# Patient Record
Sex: MALE | Race: WHITE | NOT HISPANIC OR LATINO | Employment: FULL TIME | URBAN - METROPOLITAN AREA
[De-identification: names, ages, dates, MRNs, and addresses within clinical notes are randomized per-mention and may not be internally consistent; named-entity substitution may affect disease eponyms.]

---

## 2017-11-16 ENCOUNTER — APPOINTMENT (OUTPATIENT)
Dept: RADIOLOGY | Facility: CLINIC | Age: 32
End: 2017-11-16
Payer: OTHER MISCELLANEOUS

## 2017-11-16 ENCOUNTER — TRANSCRIBE ORDERS (OUTPATIENT)
Dept: URGENT CARE | Facility: CLINIC | Age: 32
End: 2017-11-16

## 2017-11-16 ENCOUNTER — GENERIC CONVERSION - ENCOUNTER (OUTPATIENT)
Dept: OTHER | Facility: OTHER | Age: 32
End: 2017-11-16

## 2017-11-16 ENCOUNTER — OFFICE VISIT (OUTPATIENT)
Dept: URGENT CARE | Facility: CLINIC | Age: 32
End: 2017-11-16
Payer: OTHER MISCELLANEOUS

## 2017-11-16 DIAGNOSIS — M54.50 LOWER BACK PAIN: ICD-10-CM

## 2017-11-16 PROCEDURE — 99283 EMERGENCY DEPT VISIT LOW MDM: CPT

## 2017-11-16 PROCEDURE — G0382 LEV 3 HOSP TYPE B ED VISIT: HCPCS

## 2017-11-16 PROCEDURE — 72100 X-RAY EXAM L-S SPINE 2/3 VWS: CPT

## 2017-11-30 ENCOUNTER — OFFICE VISIT (OUTPATIENT)
Dept: URGENT CARE | Facility: CLINIC | Age: 32
End: 2017-11-30
Payer: OTHER MISCELLANEOUS

## 2017-11-30 PROCEDURE — 99213 OFFICE O/P EST LOW 20 MIN: CPT

## 2017-12-01 ENCOUNTER — TRANSCRIBE ORDERS (OUTPATIENT)
Dept: ADMINISTRATIVE | Facility: HOSPITAL | Age: 32
End: 2017-12-01

## 2017-12-01 DIAGNOSIS — M54.5 LOW BACK PAIN, UNSPECIFIED BACK PAIN LATERALITY, UNSPECIFIED CHRONICITY, WITH SCIATICA PRESENCE UNSPECIFIED: Primary | ICD-10-CM

## 2017-12-11 ENCOUNTER — HOSPITAL ENCOUNTER (OUTPATIENT)
Dept: MRI IMAGING | Facility: HOSPITAL | Age: 32
Discharge: HOME | End: 2017-12-11
Payer: OTHER MISCELLANEOUS

## 2017-12-11 ENCOUNTER — GENERIC CONVERSION - ENCOUNTER (OUTPATIENT)
Dept: URGENT CARE | Facility: CLINIC | Age: 32
End: 2017-12-11

## 2017-12-11 DIAGNOSIS — M54.5 LOW BACK PAIN, UNSPECIFIED BACK PAIN LATERALITY, UNSPECIFIED CHRONICITY, WITH SCIATICA PRESENCE UNSPECIFIED: ICD-10-CM

## 2017-12-11 PROCEDURE — 72148 MRI LUMBAR SPINE W/O DYE: CPT

## 2017-12-14 ENCOUNTER — OFFICE VISIT (OUTPATIENT)
Dept: URGENT CARE | Facility: CLINIC | Age: 32
End: 2017-12-14
Payer: OTHER MISCELLANEOUS

## 2017-12-14 PROCEDURE — 99213 OFFICE O/P EST LOW 20 MIN: CPT

## 2017-12-15 ENCOUNTER — GENERIC CONVERSION - ENCOUNTER (OUTPATIENT)
Dept: OTHER | Facility: OTHER | Age: 32
End: 2017-12-15

## 2018-01-10 ENCOUNTER — ALLSCRIPTS OFFICE VISIT (OUTPATIENT)
Dept: OTHER | Facility: OTHER | Age: 33
End: 2018-01-10

## 2018-01-10 DIAGNOSIS — M54.16 RADICULOPATHY OF LUMBAR REGION: ICD-10-CM

## 2018-01-11 NOTE — RESULT NOTES
Verified Results  * XR SPINE LUMBAR 2 OR 3 VIEWS INJURY [id number] 11:33AM Jacob Gustafson     Test Name Result Flag Reference   XR SPINE LUMBAR 2 OR 3 VIEWS (Report)     LUMBAR SPINE     INDICATION: Lifting injury 4 days ago  Low back pain radiating into left buttock and left leg  Burning sensation right foot  COMPARISON: None     VIEWS: AP, lateral and coned-down projections     IMAGES: 3     FINDINGS:     Straightening of the lumbar lordotic curvature  Grade 1 retrolisthesis L5 on S1  Mild disc space narrowing with degenerative endplate changes throughout the lumbar spine, most pronounced L5-S1  Facet hypertrophic changes L3-L4, L4-L5 and L5-S1  There is no radiographic evidence of acute fracture or destructive osseous lesion  Visualized soft tissues appear unremarkable  IMPRESSION:   Degenerative changes lumbar spine, most pronounced L5-S1         Workstation performed: [id number]     Signed by:   Justine Samayoa DO   11/16/17

## 2018-01-12 NOTE — PROGRESS NOTES
Assessment   1  Lumbar stenosis (724 02) (M48 061)   2  Left lumbar radiculopathy (724 4) (M54 16)    Plan   Left lumbar radiculopathy    · Diclofenac Potassium 50 MG Oral Tablet; TAKE 1 TABLET TWICE DAILY   · *1 - [facility]  * for lumbar radiculopathy  Status: Active     Requested for: [id number]  Care Summary provided  : Yes   · *1 - [facility]  * referral for epidural corticosteroid     injection  Status: Active  Requested for: [id number]  Care Summary provided  : Yes   · Follow-up visit in 6 weeks Evaluation and Treatment  Follow-up  Status: Complete  Done:    [id number]    Discussion/Summary      27-year-old male with lumbar spinal stenosis at L5-S1 with left lower extremity radiculopathy  Discussed with the patient at this point time we recommend that he initiate physical therapy as well as anti-inflammatory medications  We will arrange for a follow-up of spine and pain noted to range up to a steroid injection  Plan is as follows:   as tolerated bilateral lower extremity therapy for lumbar radiculopathy 50 mg twice a day p r n  pain referral for consideration of epidural steroid injection in 6 weeks to re-evaluate symptoms  Chief Complaint   1  Back Pain    History of Present Illness   HPI: 27-year-old male with a 3 month history of left lower extremity shooting pain and occasional numbness  This was due to work-related injury when he was lifting a heavy object and felt immediate pinching of his lower back and left lower extremity symptoms  Is tried oral medication including muscle relaxant  He has not had any spinal injections  No recent fevers or chills      Review of Systems        Constitutional: No fever or chills, feels well, no tiredness, no recent weight loss or weight gain  Eyes: No complaints of red eyes, no eyesight problems  ENT: no complaints of loss of hearing, no nosebleeds, no sore throat        Cardiovascular: No complaints of chest pain, no palpitations, no leg claudication or lower extremity edema  Respiratory: No complaints of shortness of breath, no wheezing, no cough  Gastrointestinal: No complaints of abdominal pain, no constipation, no nausea or vomiting, no diarrhea or bloody stools  Genitourinary: No complaints of dysuria or incontinence, no hesitancy, no nocturia  Musculoskeletal: as noted in HPI  Integumentary: as noted in HPI  Neurological: as noted in HPI  Psychiatric: No suicidal thoughts, no anxiety, no depression  Endocrine: No muscle weakness, no frequent urination, no excessive thirst, no feelings of weakness  ROS reviewed  Past Medical History      The active problems and past medical history were reviewed and updated today  Surgical History      The surgical history was reviewed and updated today  Family History   Family History    · No pertinent family history     The family history was reviewed and updated today  Social History    · Alcohol use (V49 89) (Z78 9)   · Caffeine use (V49 89) (F15 90)   · Coffee  The social history was reviewed and updated today  Current Meds    1  Naproxen 250 MG Oral Tablet Recorded     The medication list was reviewed and updated today  Allergies   1  No Known Drug Allergies    Vitals   Signs   Heart Rate: 71  Systolic: 079  Diastolic: 87  Height: 5 ft 10 5 in  Weight: 220 lb   BMI Calculated: 31 12  BSA Calculated: 2 19    Physical Exam     Lumbar spine:  Skin is intact there is no erythema  There is no tenderness to palpation over the spinous processes or paraspinal musculature  Diminished sensation in the left L5 and S1 distributions  Motor strength is intact bilaterally to hip flexion, knee extension and flexion, plantar flexion dorsiflexion of the ankle, and EHL  Toes are warm and well perfused  Dorsalis pedis pulses present  L4 and S1 reflexes are present   Positive straight leg raise on the left lower extremity  Negative straight leg raise in right lower extremity  Constitutional - General appearance: Normal       Musculoskeletal - Muscle strength/tone: Normal -- Upper extremity compartments: Normal -- Lower extremity compartments: Abnormal       Cardiovascular - Pulses: Normal -- Examination of extremities for edema and/or varicosities: Normal -- Heart - RRR, no murmurs, no rubs, no gallops  Lymphatic - Palpation of lymph nodes in other areas: Normal       Skin - Skin and subcutaneous tissue: Normal       Neurologic - Sensation: Normal -- Upper extremity peripheral neuro exam: Normal -- Lower extremity peripheral neuro exam: Abnormal       Psychiatric - Orientation to person, place, and time: Normal -- Mood and affect: Normal       Eyes      Conjunctiva and lids: Normal        Pupils and irises: Normal        Results/Data   I personally reviewed the films/images/results in the office today  My interpretation follows  X-ray Review Disc space narrowing noted at L5-S1  MRI Review MRI lumbar spine: Left paracentral disc protrusion at L5-S1  Attending Note   Attending Note [facility]: Level of Participation: I was present in clinic and examined the patient  Patient's History: Patient presents for evaluation of a work related injury with resultant left-sided lower back pain that radiates into his foot  He sustained his injury in November of 2017 while lifting heavy objects  He reports pain that radiates into his left lower extremity was socially numbness and tingling  Symptoms are worse with activity  Symptoms wax and wane in nature  He does report subjective weakness  Does not report incontinence of bowel or bladder  Key Parts of the Exam: Awake alert and oriented x3  Affect is appropriate  Patient does ambulate independently  He has difficulty with toe walking and heel walking on the left  In the seated position he has a positive straight leg raise left lower extremity   Negative contralateral straight leg raise  Negative Salty's test bilaterally  He does have good strength in the L2-S1 distributions bilaterally  spine MRI demonstrates large left-sided disc herniation at L5-S1 causing significant lateral recess stenosis  Diagnosis and Plan: Lumbar DDD and herniation L5-S1  Severe spinal stenosis left L5-S1  Resultant radiculopathy  A referral will be placed Pain management for epidural steroid injections at L5-S1 on the left as well as physical therapy for range of motion and strengthening  Follow-up in 4-6 weeks for re-evaluation        Signatures    Electronically signed by : Marcos Pickard MD; Emiliano 10 2018  4:11PM EST                       (Author)     Electronically signed by : OSCAR Zepeda ; Emiliano 10 2018  4:20PM EST                       (Author)

## 2018-01-18 ENCOUNTER — EVALUATION (OUTPATIENT)
Dept: PHYSICAL THERAPY | Facility: CLINIC | Age: 33
End: 2018-01-18
Payer: OTHER MISCELLANEOUS

## 2018-01-18 PROCEDURE — 97535 SELF CARE MNGMENT TRAINING: CPT

## 2018-01-18 PROCEDURE — G8991 OTHER PT/OT GOAL STATUS: HCPCS

## 2018-01-18 PROCEDURE — 97162 PT EVAL MOD COMPLEX 30 MIN: CPT

## 2018-01-18 PROCEDURE — G8990 OTHER PT/OT CURRENT STATUS: HCPCS

## 2018-01-23 VITALS
HEIGHT: 71 IN | HEART RATE: 71 BPM | WEIGHT: 220 LBS | BODY MASS INDEX: 30.8 KG/M2 | DIASTOLIC BLOOD PRESSURE: 87 MMHG | SYSTOLIC BLOOD PRESSURE: 125 MMHG

## 2018-01-23 NOTE — RESULT NOTES
Verified Results  * MRI LUMBAR SPINE WO CONTRAST [id number] 08:29AM Frantz Mendez     Test Name Result Flag Reference   MRI LUMBAR SPINE WO CONTRAST (Report)     MRI LUMBAR SPINE WITHOUT CONTRAST     INDICATION: M54 5: Low back pain  History taken directly from the electronic ordering system  lifting injury at work a few weeks ago  low back pain radiating down both legs-mostly left leg  bottom of right foot-becomes warm pt states numbness low back    and left leg when riding in car      COMPARISON: 11/16/2017     TECHNIQUE: Sagittal T1, sagittal T2, sagittal inversion recovery, axial T1 and axial T2, coronal T2       IMAGE QUALITY: Diagnostic     FINDINGS:     ALIGNMENT: Mild levoscoliosis upper lumbar spine  Normal lordosis  MARROW SIGNAL: Scattered degenerative endplate changes  No focally suspicious marrow lesions  No bone marrow edema or compression abnormality  DISTAL CORD AND CONUS: Normal size and signal within the distal cord and conus  The conus ends at the L1 level  PARASPINAL SOFT TISSUES: Paraspinal soft tissues are unremarkable  SACRUM: Normal signal within the sacrum  No evidence of insufficiency or stress fracture  LOWER THORACIC DISC SPACES: Normal disc height and signal  No disc herniation, canal stenosis or foraminal narrowing  LUMBAR DISC SPACES:        L1-L2: Normal      L2-L3: Normal      L3-L4: Normal      L4-L5: There is a diffuse disk bulge  No significant central canal or neural foraminal narrowing  L5-S1: Large left paracentral/neural foraminal disc protrusion  Severe central canal narrowing  Severe left neural foraminal narrowing  Mild right neural foraminal narrowing  IMPRESSION:     Left paracentral disc protrusion at L5-S1  Spine surgical evaluation suggested  Workstation performed: [id number]     Signed by:    Lizeth Rodriguez MD   12/12/17       Signatures   Electronically signed by : Damon Barnes NP; Dec 13 2017  2:57PM EST (Author)

## 2018-02-02 ENCOUNTER — TELEPHONE (OUTPATIENT)
Dept: PAIN MEDICINE | Facility: CLINIC | Age: 33
End: 2018-02-02

## 2018-02-12 ENCOUNTER — CONSULT (OUTPATIENT)
Dept: PAIN MEDICINE | Facility: CLINIC | Age: 33
End: 2018-02-12
Payer: OTHER MISCELLANEOUS

## 2018-02-12 VITALS
HEART RATE: 82 BPM | TEMPERATURE: 98.6 F | BODY MASS INDEX: 33.21 KG/M2 | SYSTOLIC BLOOD PRESSURE: 124 MMHG | DIASTOLIC BLOOD PRESSURE: 80 MMHG | WEIGHT: 232 LBS | HEIGHT: 70 IN

## 2018-02-12 DIAGNOSIS — M54.16 LUMBAR RADICULOPATHY: Primary | ICD-10-CM

## 2018-02-12 PROCEDURE — 99204 OFFICE O/P NEW MOD 45 MIN: CPT | Performed by: ANESTHESIOLOGY

## 2018-02-12 RX ORDER — IBUPROFEN 200 MG
TABLET ORAL EVERY 6 HOURS PRN
COMMUNITY

## 2018-02-12 RX ORDER — NAPROXEN 250 MG/1
TABLET ORAL
COMMUNITY

## 2018-02-12 RX ORDER — CYCLOBENZAPRINE HCL 5 MG
10 TABLET ORAL 3 TIMES DAILY PRN
COMMUNITY

## 2018-02-12 NOTE — PROGRESS NOTES
Assessment:  1  Lumbar radiculopathy - Bilateral         Plan: This is a 68-year-old male who presents today for initial consultation for management of low back pain and left leg pain following a work-related injury  Physical examination demonstrates positive straight leg raise on the left  The patient has completed a course of physical therapy and continues to undergo home exercises without significant relief of pain  The patient is on NSAIDs and muscle relaxant as well  MRI of the lumbosacral spine without contrast demonstrates a large paracentral disc protrusion causing severe central canal narrowing on the left  The patient's pain persists despite time, relative rest, activity modification and therapy  I recommend a [left L5, S1] transforaminal epidural steroid injection to diminish any inflammatory component of the pain  We will initially use a transforaminal approach to better concentrate the steroid along the affected nerve root  The injection may need to be repeated based on the degree of pain relief following the initial injection  In the office today, we reviewed the nature of the patient's pathology in depth using diagrams and models  We discussed the approach we would use for the epidural steroid injection and provided literature for home review  The patient understands the risks associated with the procedure including bleeding, infection, tissue injury, allergic reaction and paralysis and provided written and verbal consent in the office today  Patient is encouraged to continue with home exercises  To help with muscle spasms, Flexeril 5 mg p o  T i d  should be continued  NSAIDs  Will be continued to help with inflammation  My impressions and treatment recommendations were discussed in detail with the patient who verbalized understanding and had no further questions  Discharge instructions were provided   I personally saw and examined the patient and I agree with the above discussed plan of care  No orders of the defined types were placed in this encounter  New Medications Ordered This Visit   Medications    naproxen (NAPROSYN) 250 mg tablet     Sig: Take by mouth    cyclobenzaprine (FLEXERIL) 5 mg tablet     Sig: Take 10 mg by mouth 3 (three) times a day as needed for muscle spasms    ibuprofen (MOTRIN) 200 mg tablet     Sig: Take by mouth every 6 (six) hours as needed for mild pain       History of Present Illness:    Carrillo Orr is a 28 y o  male Who presents today for initial consultation for management of low back pain and left leg pain following a work-related injury  Today patient reports moderate to severe pain which he rates 8/10 on a pain scale  His pain is nearly constant, worse in the morning  Patient further describes pain as burning, shooting with numbness and pins and needles sensation down the left leg and occasional soreness on the right foot  Pain is aggravated with prolonged standing, bending, sitting  He denies loss of bladder or bowel  The patient recently saw Dr Mignon Pedraza for evaluation  MRI of the  Lumbosacral spine without contrast demonstrates severe left L5-S1 disc herniation  Patient is currently on Flexeril 5 mg p o  T i d , naproxen 250 mg twice a day and ibuprofen 200 mg every 6 hours p r n  Pain  Patient has completed a course of physical therapy and continues to perform home exercises without significant relief of pain  The patient is here today to consider epidural steroid injections  I have personally reviewed and/or updated the patient's past medical history, past surgical history, family history, social history, current medications, allergies, and vital signs today  Review of Systems:    Review of Systems   Constitutional: Negative for fever and unexpected weight change  HENT: Negative for trouble swallowing  Eyes: Negative for visual disturbance  Respiratory: Negative for shortness of breath and wheezing  Cardiovascular: Negative for chest pain and palpitations  Gastrointestinal: Negative for constipation, diarrhea, nausea and vomiting  Endocrine: Negative for cold intolerance, heat intolerance and polydipsia  Genitourinary: Negative for difficulty urinating and frequency  Musculoskeletal: Positive for back pain and gait problem  Negative for arthralgias, joint swelling and myalgias  Skin: Negative for rash  Neurological: Negative for dizziness, seizures, syncope, weakness and headaches  Hematological: Does not bruise/bleed easily  Psychiatric/Behavioral: Negative for dysphoric mood  All other systems reviewed and are negative  There is no problem list on file for this patient  History reviewed  No pertinent past medical history  History reviewed  No pertinent surgical history  History reviewed  No pertinent family history  Social History     Occupational History    Not on file  Social History Main Topics    Smoking status: Not on file    Smokeless tobacco: Not on file    Alcohol use Not on file    Drug use: Unknown    Sexual activity: Not on file       No current outpatient prescriptions on file prior to visit  No current facility-administered medications on file prior to visit  No Known Allergies    Physical Exam:    /80   Pulse 82   Temp 98 6 °F (37 °C)   Ht 5' 10" (1 778 m)   Wt 105 kg (232 lb)   BMI 33 29 kg/m²     Constitutional: normal, well developed, well nourished, alert, in no distress and non-toxic and no overt pain behavior    Eyes: anicteric  HEENT: grossly intact  Neck: supple, symmetric, trachea midline and no masses   Pulmonary:even and unlabored  Cardiovascular:No edema or pitting edema present  Skin:Normal without rashes or lesions and well hydrated  Psychiatric:Mood and affect appropriate  Neurologic:Cranial Nerves II-XII grossly intact  Musculoskeletal:normal    Lumbar Spine Exam    Appearance:  Normal lordosis  Palpation/Tenderness:  left lumbar paraspinal tenderness  Sensory:  no sensory deficits noted  diminished pin prick sensation, location: left leg and foot  dimished light touch sensation, location: left leg and foot  Range of Motion:  Full range of motion with no pain or limitations in flexion, extension, lateral flexion and rotation  Motor Strength:  Left foot dorsiflexion:  4/5  Left foot plantar flexion:  4/5  Reflexes:  Left Patellar:  2+   Special Tests:  Left Straight Leg Test:  positive    Imaging

## 2018-02-20 ENCOUNTER — HOSPITAL ENCOUNTER (OUTPATIENT)
Dept: RADIOLOGY | Facility: CLINIC | Age: 33
Discharge: HOME | End: 2018-02-20
Attending: ANESTHESIOLOGY
Payer: OTHER MISCELLANEOUS

## 2018-02-20 VITALS
RESPIRATION RATE: 18 BRPM | DIASTOLIC BLOOD PRESSURE: 89 MMHG | OXYGEN SATURATION: 97 % | SYSTOLIC BLOOD PRESSURE: 133 MMHG | WEIGHT: 230 LBS | BODY MASS INDEX: 33 KG/M2 | TEMPERATURE: 98.1 F | HEART RATE: 91 BPM

## 2018-02-20 DIAGNOSIS — M54.16 LUMBAR RADICULOPATHY: Primary | ICD-10-CM

## 2018-02-20 PROCEDURE — 64484 NJX AA&/STRD TFRM EPI L/S EA: CPT | Performed by: ANESTHESIOLOGY

## 2018-02-20 PROCEDURE — 64483 NJX AA&/STRD TFRM EPI L/S 1: CPT | Performed by: ANESTHESIOLOGY

## 2018-02-20 RX ORDER — LIDOCAINE HYDROCHLORIDE 10 MG/ML
5 INJECTION, SOLUTION EPIDURAL; INFILTRATION; INTRACAUDAL; PERINEURAL ONCE
Status: COMPLETED | OUTPATIENT
Start: 2018-02-20 | End: 2018-02-20

## 2018-02-20 RX ORDER — NAPROXEN 500 MG/1
500 TABLET ORAL 2 TIMES DAILY WITH MEALS
Qty: 60 TABLET | Refills: 2 | Status: SHIPPED | OUTPATIENT
Start: 2018-02-20 | End: 2018-03-22

## 2018-02-20 RX ORDER — PAPAVERINE HCL 150 MG
20 CAPSULE, EXTENDED RELEASE ORAL ONCE
Status: COMPLETED | OUTPATIENT
Start: 2018-02-20 | End: 2018-02-20

## 2018-02-20 RX ORDER — BUPIVACAINE HCL/PF 2.5 MG/ML
10 VIAL (ML) INJECTION ONCE
Status: COMPLETED | OUTPATIENT
Start: 2018-02-20 | End: 2018-02-20

## 2018-02-20 RX ADMIN — DEXAMETHASONE SODIUM PHOSPHATE 20 MG: 10 INJECTION, SOLUTION INTRAMUSCULAR; INTRAVENOUS at 08:45

## 2018-02-20 RX ADMIN — LIDOCAINE HYDROCHLORIDE 5 ML: 10 INJECTION, SOLUTION EPIDURAL; INFILTRATION; INTRACAUDAL; PERINEURAL at 08:39

## 2018-02-20 RX ADMIN — BUPIVACAINE HYDROCHLORIDE 10 ML: 2.5 INJECTION, SOLUTION EPIDURAL; INFILTRATION; INTRACAUDAL at 08:44

## 2018-02-20 RX ADMIN — IOHEXOL 2 ML: 300 INJECTION, SOLUTION INTRAVENOUS at 08:44

## 2018-02-20 NOTE — H&P
History of Present Illness: The patient is a 28 y o  male who presents with complaints of low back and left leg pain  Patient is here for a left L5, S1 TFESI  Patient Active Problem List   Diagnosis    Lumbar radiculopathy - Left       No past medical history on file  No past surgical history on file  Current Outpatient Prescriptions:     cyclobenzaprine (FLEXERIL) 5 mg tablet, Take 10 mg by mouth 3 (three) times a day as needed for muscle spasms, Disp: , Rfl:     ibuprofen (MOTRIN) 200 mg tablet, Take by mouth every 6 (six) hours as needed for mild pain, Disp: , Rfl:     naproxen (NAPROSYN) 250 mg tablet, Take by mouth, Disp: , Rfl:     Current Facility-Administered Medications:     bupivacaine (PF) (MARCAINE) 0 25 % injection 10 mL, 10 mL, Perineural, Once, Marty Stark MD    dexamethasone (PF) (DECADRON) injection 20 mg, 20 mg, Perineural, Once, Marty Stark MD    iohexol (OMNIPAQUE) 300 mg/mL injection 50 mL, 50 mL, Perineural, Once, Marty Stark MD    lidocaine (PF) (XYLOCAINE-MPF) 1 % injection 5 mL, 5 mL, Perineural, Once, Marty Stark MD    No Known Allergies    Physical Exam:   Vitals:    02/20/18 0814   BP: 129/85   Pulse: 91   Resp: 18   Temp: 98 1 °F (36 7 °C)   SpO2: 97%     General: Awake, Alert, Oriented x 3, Mood and affect appropriate  Respiratory: Respirations even and unlabored  Cardiovascular: Peripheral pulses intact; no edema  Musculoskeletal Exam: Positive SLR Left     ASA Score: 2    Assessment: Lumbar radiculitis       Patient is instructed to continue with light duty until he is seen back in the office for reassessment - 4 weeks post epidural      Plan: left L5, S1

## 2018-02-20 NOTE — H&P (VIEW-ONLY)
Assessment:  1  Lumbar radiculopathy - Bilateral         Plan: This is a 58-year-old male who presents today for initial consultation for management of low back pain and left leg pain following a work-related injury  Physical examination demonstrates positive straight leg raise on the left  The patient has completed a course of physical therapy and continues to undergo home exercises without significant relief of pain  The patient is on NSAIDs and muscle relaxant as well  MRI of the lumbosacral spine without contrast demonstrates a large paracentral disc protrusion causing severe central canal narrowing on the left  The patient's pain persists despite time, relative rest, activity modification and therapy  I recommend a [left L5, S1] transforaminal epidural steroid injection to diminish any inflammatory component of the pain  We will initially use a transforaminal approach to better concentrate the steroid along the affected nerve root  The injection may need to be repeated based on the degree of pain relief following the initial injection  In the office today, we reviewed the nature of the patient's pathology in depth using diagrams and models  We discussed the approach we would use for the epidural steroid injection and provided literature for home review  The patient understands the risks associated with the procedure including bleeding, infection, tissue injury, allergic reaction and paralysis and provided written and verbal consent in the office today  Patient is encouraged to continue with home exercises  To help with muscle spasms, Flexeril 5 mg p o  T i d  should be continued  NSAIDs  Will be continued to help with inflammation  My impressions and treatment recommendations were discussed in detail with the patient who verbalized understanding and had no further questions  Discharge instructions were provided   I personally saw and examined the patient and I agree with the above discussed plan of care  No orders of the defined types were placed in this encounter  New Medications Ordered This Visit   Medications    naproxen (NAPROSYN) 250 mg tablet     Sig: Take by mouth    cyclobenzaprine (FLEXERIL) 5 mg tablet     Sig: Take 10 mg by mouth 3 (three) times a day as needed for muscle spasms    ibuprofen (MOTRIN) 200 mg tablet     Sig: Take by mouth every 6 (six) hours as needed for mild pain       History of Present Illness:    Malik Beckford is a 28 y o  male Who presents today for initial consultation for management of low back pain and left leg pain following a work-related injury  Today patient reports moderate to severe pain which he rates 8/10 on a pain scale  His pain is nearly constant, worse in the morning  Patient further describes pain as burning, shooting with numbness and pins and needles sensation down the left leg and occasional soreness on the right foot  Pain is aggravated with prolonged standing, bending, sitting  He denies loss of bladder or bowel  The patient recently saw Dr Aubrie Richards for evaluation  MRI of the  Lumbosacral spine without contrast demonstrates severe left L5-S1 disc herniation  Patient is currently on Flexeril 5 mg p o  T i d , naproxen 250 mg twice a day and ibuprofen 200 mg every 6 hours p r n  Pain  Patient has completed a course of physical therapy and continues to perform home exercises without significant relief of pain  The patient is here today to consider epidural steroid injections  I have personally reviewed and/or updated the patient's past medical history, past surgical history, family history, social history, current medications, allergies, and vital signs today  Review of Systems:    Review of Systems   Constitutional: Negative for fever and unexpected weight change  HENT: Negative for trouble swallowing  Eyes: Negative for visual disturbance  Respiratory: Negative for shortness of breath and wheezing  Cardiovascular: Negative for chest pain and palpitations  Gastrointestinal: Negative for constipation, diarrhea, nausea and vomiting  Endocrine: Negative for cold intolerance, heat intolerance and polydipsia  Genitourinary: Negative for difficulty urinating and frequency  Musculoskeletal: Positive for back pain and gait problem  Negative for arthralgias, joint swelling and myalgias  Skin: Negative for rash  Neurological: Negative for dizziness, seizures, syncope, weakness and headaches  Hematological: Does not bruise/bleed easily  Psychiatric/Behavioral: Negative for dysphoric mood  All other systems reviewed and are negative  There is no problem list on file for this patient  History reviewed  No pertinent past medical history  History reviewed  No pertinent surgical history  History reviewed  No pertinent family history  Social History     Occupational History    Not on file  Social History Main Topics    Smoking status: Not on file    Smokeless tobacco: Not on file    Alcohol use Not on file    Drug use: Unknown    Sexual activity: Not on file       No current outpatient prescriptions on file prior to visit  No current facility-administered medications on file prior to visit  No Known Allergies    Physical Exam:    /80   Pulse 82   Temp 98 6 °F (37 °C)   Ht 5' 10" (1 778 m)   Wt 105 kg (232 lb)   BMI 33 29 kg/m²     Constitutional: normal, well developed, well nourished, alert, in no distress and non-toxic and no overt pain behavior    Eyes: anicteric  HEENT: grossly intact  Neck: supple, symmetric, trachea midline and no masses   Pulmonary:even and unlabored  Cardiovascular:No edema or pitting edema present  Skin:Normal without rashes or lesions and well hydrated  Psychiatric:Mood and affect appropriate  Neurologic:Cranial Nerves II-XII grossly intact  Musculoskeletal:normal    Lumbar Spine Exam    Appearance:  Normal lordosis  Palpation/Tenderness:  left lumbar paraspinal tenderness  Sensory:  no sensory deficits noted  diminished pin prick sensation, location: left leg and foot  dimished light touch sensation, location: left leg and foot  Range of Motion:  Full range of motion with no pain or limitations in flexion, extension, lateral flexion and rotation  Motor Strength:  Left foot dorsiflexion:  4/5  Left foot plantar flexion:  4/5  Reflexes:  Left Patellar:  2+   Special Tests:  Left Straight Leg Test:  positive    Imaging

## 2018-02-20 NOTE — DISCHARGE INSTRUCTIONS
Epidural Steroid Injection   WHAT YOU NEED TO KNOW:   An epidural steroid injection (MARLENE) is a procedure to inject steroid medicine into the epidural space  The epidural space is between your spinal cord and vertebrae  Steroids reduce inflammation and fluid buildup in your spine that may be causing pain  You may be given pain medicine along with the steroids  ACTIVITY  · Do not drive or operate machinery today  · No strenuous activity today - bending, lifting, etc   · You may resume normal activites starting tomorrow - start slowly and as tolerated  · You may shower today, but no tub baths or hot tubs  · You may have numbness for several hours from the local anesthetic  Please use caution and common sense, especially with weight-bearing activities  CARE OF THE INJECTION SITE  · If you have soreness or pain, apply ice to the area today (20 minutes on/20 minutes off)  · Starting tomorrow, you may use warm, moist heat or ice if needed  · You may have an increase or change in your discomfort for 36-48 hours after your treatment  · Apply ice and continue with any pain medication you have been prescribed  · Notify [facility] if you have any of the following: redness, drainage, swelling, headache, stiff neck or fever above 100°F     SPECIAL INSTRUCTIONS  · Our office will contact you in approximately 7 days for a progress report  MEDICATIONS  · Continue to take all routine medications  · Our office may have instructed you to hold some medications  If you have a problem specifically related to your procedure, please call our office at [phone]  Problems not related to your procedure should be directed to your primary care physician

## 2018-02-20 NOTE — INTERVAL H&P NOTE
Update: (This section must be completed if the H&P was completed greater than 24 hrs to procedure or admission)    H&P reviewed  After examining the patient, I find no changed to the H&P since it had been written  Patient re-evaluated   Accept as history and physical     Vijayaroger Navarro MD/February 20, 2018/8:29 AM

## 2018-02-28 ENCOUNTER — TELEPHONE (OUTPATIENT)
Dept: RADIOLOGY | Facility: CLINIC | Age: 33
End: 2018-02-28

## 2021-04-23 ENCOUNTER — APPOINTMENT (EMERGENCY)
Dept: CT IMAGING | Facility: HOSPITAL | Age: 36
End: 2021-04-23
Payer: COMMERCIAL

## 2021-04-23 ENCOUNTER — HOSPITAL ENCOUNTER (EMERGENCY)
Facility: HOSPITAL | Age: 36
Discharge: HOME | End: 2021-04-23
Attending: EMERGENCY MEDICINE
Payer: COMMERCIAL

## 2021-04-23 ENCOUNTER — APPOINTMENT (EMERGENCY)
Dept: RADIOLOGY | Facility: HOSPITAL | Age: 36
End: 2021-04-23
Payer: COMMERCIAL

## 2021-04-23 VITALS
WEIGHT: 244.71 LBS | HEART RATE: 79 BPM | RESPIRATION RATE: 16 BRPM | BODY MASS INDEX: 35.11 KG/M2 | SYSTOLIC BLOOD PRESSURE: 119 MMHG | OXYGEN SATURATION: 97 % | DIASTOLIC BLOOD PRESSURE: 73 MMHG | TEMPERATURE: 99 F

## 2021-04-23 DIAGNOSIS — J93.9 PNEUMOTHORAX ON RIGHT: Primary | ICD-10-CM

## 2021-04-23 DIAGNOSIS — R10.9 ABDOMINAL PAIN: ICD-10-CM

## 2021-04-23 LAB
ALBUMIN SERPL BCP-MCNC: 3.8 G/DL (ref 3.5–5)
ALP SERPL-CCNC: 42 U/L (ref 46–116)
ALT SERPL W P-5'-P-CCNC: 81 U/L (ref 12–78)
ANION GAP SERPL CALCULATED.3IONS-SCNC: 10 MMOL/L (ref 4–13)
AST SERPL W P-5'-P-CCNC: 24 U/L (ref 5–45)
BASOPHILS # BLD AUTO: 0.02 THOUSANDS/ΜL (ref 0–0.1)
BASOPHILS NFR BLD AUTO: 0 % (ref 0–1)
BILIRUB SERPL-MCNC: 0.56 MG/DL (ref 0.2–1)
BUN SERPL-MCNC: 11 MG/DL (ref 5–25)
CALCIUM SERPL-MCNC: 8.1 MG/DL (ref 8.3–10.1)
CHLORIDE SERPL-SCNC: 105 MMOL/L (ref 100–108)
CO2 SERPL-SCNC: 26 MMOL/L (ref 21–32)
CREAT SERPL-MCNC: 0.95 MG/DL (ref 0.6–1.3)
EOSINOPHIL # BLD AUTO: 0.02 THOUSAND/ΜL (ref 0–0.61)
EOSINOPHIL NFR BLD AUTO: 0 % (ref 0–6)
ERYTHROCYTE [DISTWIDTH] IN BLOOD BY AUTOMATED COUNT: 13.2 % (ref 11.6–15.1)
GFR SERPL CREATININE-BSD FRML MDRD: 103 ML/MIN/1.73SQ M
GLUCOSE SERPL-MCNC: 108 MG/DL (ref 65–140)
HCT VFR BLD AUTO: 45.7 % (ref 36.5–49.3)
HGB BLD-MCNC: 15.4 G/DL (ref 12–17)
IMM GRANULOCYTES # BLD AUTO: 0.05 THOUSAND/UL (ref 0–0.2)
IMM GRANULOCYTES NFR BLD AUTO: 0 % (ref 0–2)
LIPASE SERPL-CCNC: 65 U/L (ref 73–393)
LYMPHOCYTES # BLD AUTO: 0.38 THOUSANDS/ΜL (ref 0.6–4.47)
LYMPHOCYTES NFR BLD AUTO: 3 % (ref 14–44)
MCH RBC QN AUTO: 30.7 PG (ref 26.8–34.3)
MCHC RBC AUTO-ENTMCNC: 33.7 G/DL (ref 31.4–37.4)
MCV RBC AUTO: 91 FL (ref 82–98)
MONOCYTES # BLD AUTO: 0.6 THOUSAND/ΜL (ref 0.17–1.22)
MONOCYTES NFR BLD AUTO: 5 % (ref 4–12)
NEUTROPHILS # BLD AUTO: 10.46 THOUSANDS/ΜL (ref 1.85–7.62)
NEUTS SEG NFR BLD AUTO: 92 % (ref 43–75)
NRBC BLD AUTO-RTO: 0 /100 WBCS
PLATELET # BLD AUTO: 260 THOUSANDS/UL (ref 149–390)
PMV BLD AUTO: 9.6 FL (ref 8.9–12.7)
POTASSIUM SERPL-SCNC: 3.6 MMOL/L (ref 3.5–5.3)
PROT SERPL-MCNC: 7.2 G/DL (ref 6.4–8.2)
RBC # BLD AUTO: 5.02 MILLION/UL (ref 3.88–5.62)
SODIUM SERPL-SCNC: 141 MMOL/L (ref 136–145)
WBC # BLD AUTO: 11.53 THOUSAND/UL (ref 4.31–10.16)

## 2021-04-23 PROCEDURE — 83690 ASSAY OF LIPASE: CPT | Performed by: EMERGENCY MEDICINE

## 2021-04-23 PROCEDURE — 74177 CT ABD & PELVIS W/CONTRAST: CPT

## 2021-04-23 PROCEDURE — 99285 EMERGENCY DEPT VISIT HI MDM: CPT | Performed by: EMERGENCY MEDICINE

## 2021-04-23 PROCEDURE — 99284 EMERGENCY DEPT VISIT MOD MDM: CPT

## 2021-04-23 PROCEDURE — 96361 HYDRATE IV INFUSION ADD-ON: CPT

## 2021-04-23 PROCEDURE — 80053 COMPREHEN METABOLIC PANEL: CPT | Performed by: EMERGENCY MEDICINE

## 2021-04-23 PROCEDURE — 96374 THER/PROPH/DIAG INJ IV PUSH: CPT

## 2021-04-23 PROCEDURE — 71046 X-RAY EXAM CHEST 2 VIEWS: CPT

## 2021-04-23 PROCEDURE — 96375 TX/PRO/DX INJ NEW DRUG ADDON: CPT

## 2021-04-23 PROCEDURE — 85025 COMPLETE CBC W/AUTO DIFF WBC: CPT | Performed by: EMERGENCY MEDICINE

## 2021-04-23 PROCEDURE — 36415 COLL VENOUS BLD VENIPUNCTURE: CPT

## 2021-04-23 RX ORDER — ONDANSETRON 4 MG/1
4 TABLET, ORALLY DISINTEGRATING ORAL EVERY 6 HOURS PRN
Qty: 20 TABLET | Refills: 0 | Status: SHIPPED | OUTPATIENT
Start: 2021-04-23

## 2021-04-23 RX ORDER — MORPHINE SULFATE 4 MG/ML
4 INJECTION, SOLUTION INTRAMUSCULAR; INTRAVENOUS ONCE
Status: COMPLETED | OUTPATIENT
Start: 2021-04-23 | End: 2021-04-23

## 2021-04-23 RX ORDER — KETOROLAC TROMETHAMINE 30 MG/ML
15 INJECTION, SOLUTION INTRAMUSCULAR; INTRAVENOUS ONCE
Status: COMPLETED | OUTPATIENT
Start: 2021-04-23 | End: 2021-04-23

## 2021-04-23 RX ADMIN — SODIUM CHLORIDE 1000 ML: 0.9 INJECTION, SOLUTION INTRAVENOUS at 16:38

## 2021-04-23 RX ADMIN — IOHEXOL 100 ML: 350 INJECTION, SOLUTION INTRAVENOUS at 17:41

## 2021-04-23 RX ADMIN — KETOROLAC TROMETHAMINE 15 MG: 30 INJECTION, SOLUTION INTRAMUSCULAR at 16:23

## 2021-04-23 RX ADMIN — MORPHINE SULFATE 4 MG: 4 INJECTION INTRAVENOUS at 16:22

## 2021-04-23 NOTE — Clinical Note
Malik Beckford was seen and treated in our emergency department on 4/23/2021  Diagnosis:     Corrinne Ina  may return to work on return date  He may return on this date: 04/27/2021         If you have any questions or concerns, please don't hesitate to call        Megan Kurtz MD    ______________________________           _______________          _______________  Hospital Representative                              Date                                Time

## 2021-04-23 NOTE — Clinical Note
Alonso Nunez was seen and treated in our emergency department on 4/23/2021  Diagnosis:     Kirstin Magana  may return to work on return date  He may return on this date: 04/27/2021         If you have any questions or concerns, please don't hesitate to call        Becki Olmos MD    ______________________________           _______________          _______________  Hospital Representative                              Date                                Time

## 2021-04-23 NOTE — ED NOTES
Pt vomited  Stated "I fell better"  Refused any nausea medication at this time  Briseida Nice, [address] [facility]  04/23/21 7537

## 2021-04-24 NOTE — ED PROVIDER NOTES
History  Chief Complaint   Patient presents with    Abdominal Pain     PT states bloated, diarrhea, abd pain Since 5am this morning  Sent by urgent care for severe abd pain  History provided by:  Patient   used: No    Abdominal Pain  Pain location:  Generalized  Pain quality: cramping    Pain radiates to:  Does not radiate  Pain severity:  Moderate  Onset quality:  Gradual  Duration:  10 hours  Timing:  Constant  Progression:  Worsening  Chronicity:  New  Relieved by:  Nothing  Worsened by:  Nothing  Ineffective treatments:  None tried  Associated symptoms: nausea and vomiting    Associated symptoms: no chest pain, no chills, no cough, no dysuria, no fever, no hematuria, no shortness of breath and no sore throat        Prior to Admission Medications   Prescriptions Last Dose Informant Patient Reported? Taking? cyclobenzaprine (FLEXERIL) 5 mg tablet   Yes No   Sig: Take 10 mg by mouth 3 (three) times a day as needed for muscle spasms   ibuprofen (MOTRIN) 200 mg tablet   Yes No   Sig: Take by mouth every 6 (six) hours as needed for mild pain   naproxen (NAPROSYN) 250 mg tablet   Yes No   Sig: Take by mouth      Facility-Administered Medications: None       History reviewed  No pertinent past medical history  History reviewed  No pertinent surgical history  History reviewed  No pertinent family history  I have reviewed and agree with the history as documented  E-Cigarette/Vaping     E-Cigarette/Vaping Substances     Social History     Tobacco Use    Smoking status: Former Smoker    Smokeless tobacco: Never Used   Substance Use Topics    Alcohol use: Yes     Frequency: 2-3 times a week    Drug use: Not on file       Review of Systems   Constitutional: Negative for chills and fever  HENT: Negative for ear pain and sore throat  Eyes: Negative for pain and visual disturbance  Respiratory: Negative for cough and shortness of breath      Cardiovascular: Negative for chest pain and palpitations  Gastrointestinal: Positive for abdominal pain, nausea and vomiting  Genitourinary: Negative for dysuria and hematuria  Musculoskeletal: Negative for arthralgias and back pain  Skin: Negative for color change and rash  Neurological: Negative for seizures and syncope  All other systems reviewed and are negative  Physical Exam  Physical Exam  Vitals signs and nursing note reviewed  Constitutional:       Appearance: He is well-developed  HENT:      Head: Normocephalic and atraumatic  Eyes:      Conjunctiva/sclera: Conjunctivae normal    Neck:      Musculoskeletal: Neck supple  Cardiovascular:      Rate and Rhythm: Normal rate and regular rhythm  Heart sounds: No murmur  Pulmonary:      Effort: Pulmonary effort is normal  No respiratory distress  Breath sounds: Normal breath sounds  Abdominal:      Palpations: Abdomen is soft  Tenderness: There is generalized abdominal tenderness  There is no guarding or rebound  Hernia: No hernia is present  Skin:     General: Skin is warm and dry  Capillary Refill: Capillary refill takes less than 2 seconds  Neurological:      General: No focal deficit present  Mental Status: He is alert and oriented to person, place, and time  Motor: No weakness           Vital Signs  ED Triage Vitals   Temperature Pulse Respirations Blood Pressure SpO2   04/23/21 1439 04/23/21 1439 04/23/21 1439 04/23/21 1439 04/23/21 1439   (!) 100 6 °F (38 1 °C) 102 22 119/78 96 %      Temp Source Heart Rate Source Patient Position - Orthostatic VS BP Location FiO2 (%)   04/23/21 1439 04/23/21 1439 04/23/21 1635 04/23/21 1439 --   Oral Monitor Lying Left arm       Pain Score       04/23/21 1622       8           Vitals:    04/23/21 1439 04/23/21 1635 04/23/21 1818 04/23/21 2022   BP: 119/78 123/74 127/80 119/73   Pulse: 102 97 97 79   Patient Position - Orthostatic VS:  Lying Lying          Visual Acuity      ED Medications  Medications   sodium chloride 0 9 % bolus 1,000 mL (0 mL Intravenous Stopped 4/23/21 1818)   morphine (PF) 4 mg/mL injection 4 mg (4 mg Intravenous Given 4/23/21 1622)   ketorolac (TORADOL) injection 15 mg (15 mg Intravenous Given 4/23/21 1623)   iohexol (OMNIPAQUE) 350 MG/ML injection (SINGLE-DOSE) 100 mL (100 mL Intravenous Given 4/23/21 1741)       Diagnostic Studies  Results Reviewed     Procedure Component Value Units Date/Time    CBC and differential [[id number]]  (Abnormal) Collected: 04/23/21 1638    Lab Status: Final result Specimen: Blood from Arm, Left Updated: 04/23/21 1740     WBC 11 53 Thousand/uL      RBC 5 02 Million/uL      Hemoglobin 15 4 g/dL      Hematocrit 45 7 %      MCV 91 fL      MCH 30 7 pg      MCHC 33 7 g/dL      RDW 13 2 %      MPV 9 6 fL      Platelets 387 Thousands/uL      nRBC 0 /100 WBCs      Neutrophils Relative 92 %      Immat GRANS % 0 %      Lymphocytes Relative 3 %      Monocytes Relative 5 %      Eosinophils Relative 0 %      Basophils Relative 0 %      Neutrophils Absolute 10 46 Thousands/µL      Immature Grans Absolute 0 05 Thousand/uL      Lymphocytes Absolute 0 38 Thousands/µL      Monocytes Absolute 0 60 Thousand/µL      Eosinophils Absolute 0 02 Thousand/µL      Basophils Absolute 0 02 Thousands/µL     Comprehensive metabolic panel [[id number]]  (Abnormal) Collected: 04/23/21 1552    Lab Status: Final result Specimen: Blood from Arm, Left Updated: 04/23/21 1621     Sodium 141 mmol/L      Potassium 3 6 mmol/L      Chloride 105 mmol/L      CO2 26 mmol/L      ANION GAP 10 mmol/L      BUN 11 mg/dL      Creatinine 0 95 mg/dL      Glucose 108 mg/dL      Calcium 8 1 mg/dL      AST 24 U/L      ALT 81 U/L      Alkaline Phosphatase 42 U/L      Total Protein 7 2 g/dL      Albumin 3 8 g/dL      Total Bilirubin 0 56 mg/dL      eGFR 103 ml/min/1 73sq m     Narrative:      [place] guidelines for Chronic Kidney Disease (CKD):     Stage 1 with normal or high GFR (GFR > 90 mL/min/1 73 square meters)    Stage 2 Mild CKD (GFR = 60-89 mL/min/1 73 square meters)    Stage 3A Moderate CKD (GFR = 45-59 mL/min/1 73 square meters)    Stage 3B Moderate CKD (GFR = 30-44 mL/min/1 73 square meters)    Stage 4 Severe CKD (GFR = 15-29 mL/min/1 73 square meters)    Stage 5 End Stage CKD (GFR <15 mL/min/1 73 square meters)  Note: GFR calculation is accurate only with a steady state creatinine    Lipase [[id number]]  (Abnormal) Collected: 04/23/21 1552    Lab Status: Final result Specimen: Blood from Arm, Left Updated: 04/23/21 1621     Lipase 65 u/L                  XR chest 2 views   Final Result by Lauri Cho MD (04/24 0633)      Small right apical pneumothorax  Findings concur with the preliminary report by the referring clinician already in PACS and/or our electronic record EPIC  Workstation performed: [id number]         CT abdomen pelvis with contrast   Final Result by Slime Boyd DO (04/23 1852)      No acute inflammatory process identified  Small right pneumothorax incompletely visualized  I personally discussed this study with Thalia Monique on 4/23/2021 at 6:52 PM                Workstation performed: [id number]                    Procedures  Procedures         ED Course                             SBIRT 20yo+      Most Recent Value   SBIRT (23 yo +)   In order to provide better care to our patients, we are screening all of our patients for alcohol and drug use  Would it be okay to ask you these screening questions? Yes Filed at: 04/23/2021 1604   Initial Alcohol Screen: US AUDIT-C    1  How often do you have a drink containing alcohol?  0 Filed at: 04/23/2021 1604   2  How many drinks containing alcohol do you have on a typical day you are drinking? 0 Filed at: 04/23/2021 1604   3a  Male UNDER 65: How often do you have five or more drinks on one occasion? 0 Filed at: 04/23/2021 1604   3b   FEMALE Any Age, or MALE 65+: How often do you have 4 or more drinks on one occassion? 0 Filed at: 04/23/2021 1604   Audit-C Score  0 Filed at: 04/23/2021 1604   JONATAN: How many times in the past year have you    Used an illegal drug or used a prescription medication for non-medical reasons? Never Filed at: 04/23/2021 1604                    MDM  Number of Diagnoses or Management Options  Abdominal pain: new and requires workup  Pneumothorax on right: new and requires workup  Diagnosis management comments: 72-year-old male presented for evaluation of gradually worsening, generalize, cramping abdominal pain that started this morning around 05:00  Has also had diarrhea as well as some nausea and vomiting  He had diffuse tenderness on exam without rebound or guarding in a mild elevation in his white blood cell count  Given diffuse tenderness in the severity of the patient's symptoms CT scan of the abdomen and pelvis was performed  This was negative for acute intra-abdominal pathology but did demonstrate a small pneumothorax on the right side  This was also evident on upright plain radiograph of the chest   Pneumothorax was small (around 5% )  Of patient had normal vital signs and not display any evidence of tamponade physiology  Given the small volume of the pneumothorax tube thoracostomy was not indicated at this time however I did strongly recommend admission to the hospital for supplemental oxygen, observation, and assurance of resolution of the pneumothorax  Patient and his wife were adamant that he would not stay in the hospital   We discussed risks and benefits of by discharge with close outpatient follow-up versus admission at length    I did stress the fact that although this pneumothorax is small at present it does pose some risk of getting larger including progressing to tension pneumothorax which can be fatal   We discussed the possibility of worsening of medical condition, temporary or permanent disability, prolonged hospital stay, increasing medical care, loss of life style, loss of income, and even death that could result from leaving the emergency department prior to being stable for discharge  Patient acknowledged these risks and was able to verbalize them back to me  He is awake, alert, and oriented x4, appears to have capacity to make medical decisions  He agreed to sign AMA paperwork  We discussed strict return precautions for any worsening pain or shortness of breath as well as any unremitting, new, or worsening symptoms  Amount and/or Complexity of Data Reviewed  Clinical lab tests: reviewed and ordered  Tests in the radiology section of CPT®: reviewed and ordered  Review and summarize past medical records: yes  Independent visualization of images, tracings, or specimens: yes        Disposition  Final diagnoses:   Pneumothorax on right   Abdominal pain     Time reflects when diagnosis was documented in both MDM as applicable and the Disposition within this note     Time User Action Codes Description Comment    4/23/2021  7:42 PM Paige Estevez Add [J93 9] Pneumothorax on right     4/23/2021  8:04 PM Tim DE LA VEGA Add [R10 9] Abdominal pain     4/23/2021  8:05 PM Topher Ricks Modify [J93 9] Pneumothorax on right       ED Disposition     ED Disposition Condition Date/Time Comment    [facility]  Fri Apr 23, 2021  8:06 PM Date: 4/23/2021  Patient: Trish Lamas  Admitted: 4/23/2021  3:27 PM  Attending Provider: MD Trish Martinez or his authorized caregiver has made the decision for the patient to leave the emergency department against the advice of his a ttending physician  He or his authorized caregiver has been informed and understands the inherent risks, including death, temporary or permanent disability, loss of life style, loss of income, need for further evaluation and treatment  He or his aut horized caregiver has decided to accept the responsibility for this decision   Trish Lamas and all necessary parties have been advised that he may return for further evaluation or treatment  His condition at time of discharge was stable  Lucy Maya  had current vital signs as follows:  /80 (BP Location: Left arm)   Pulse 97   Temp 99 °F (37 2 °C)   Resp 18   Wt 111 kg (244 lb 11 4 oz)         Follow-up Information     Follow up With Specialties Details Why Contact Info Additional [facility] [address] Pulmonology   [facility] [address] [facility], [address], [place], [id number] [phone]          Discharge Medication List as of 4/23/2021  8:10 PM      START taking these medications    Details   ondansetron (ZOFRAN-ODT) 4 mg disintegrating tablet Take 1 tablet (4 mg total) by mouth every 6 (six) hours as needed for nausea or vomiting, Starting Fri 4/23/2021, Print         CONTINUE these medications which have NOT CHANGED    Details   cyclobenzaprine (FLEXERIL) 5 mg tablet Take 10 mg by mouth 3 (three) times a day as needed for muscle spasms, Historical Med      ibuprofen (MOTRIN) 200 mg tablet Take by mouth every 6 (six) hours as needed for mild pain, Historical Med      naproxen (NAPROSYN) 250 mg tablet Take by mouth, Historical Med           No discharge procedures on file      PDMP Review     None          ED Provider  Electronically Signed by           Ignacio Pike MD  05/14/21 3335

## 2021-04-24 NOTE — DISCHARGE INSTRUCTIONS
If your symptoms worsen including worsening pain or increased shortness of breath  Please return to the emergency department immediately  Please note you can return to the emergency department for re-evaluation at any time

## 2024-03-19 ENCOUNTER — HOSPITAL ENCOUNTER (EMERGENCY)
Facility: HOSPITAL | Age: 39
Discharge: HOME | End: 2024-03-19
Attending: EMERGENCY MEDICINE
Payer: COMMERCIAL

## 2024-03-19 VITALS
OXYGEN SATURATION: 97 % | DIASTOLIC BLOOD PRESSURE: 78 MMHG | RESPIRATION RATE: 18 BRPM | HEART RATE: 79 BPM | SYSTOLIC BLOOD PRESSURE: 138 MMHG | TEMPERATURE: 97.9 F

## 2024-03-19 DIAGNOSIS — S30.860A TICK BITE OF BACK, INITIAL ENCOUNTER: Primary | ICD-10-CM
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
## 2024-03-19 PROCEDURE — 99282 EMERGENCY DEPT VISIT SF MDM: CPT

## 2024-03-19 PROCEDURE — 99284 EMERGENCY DEPT VISIT MOD MDM: CPT | Performed by: PHYSICIAN ASSISTANT

## 2024-03-19 RX ORDER — DOXYCYCLINE HYCLATE 100 MG/1
200 CAPSULE ORAL ONCE
Status: COMPLETED | OUTPATIENT
Start: 2024-03-19 | End: 2024-03-19

## 2024-03-19 RX ADMIN — DOXYCYCLINE HYCLATE 200 MG: 100 CAPSULE ORAL at 13:21

## 2024-03-19 NOTE — ED PROVIDER NOTES
History  Chief Complaint   Patient presents with    Tick Bite     Patients wife removed a tick from patients upper left back this morning.     37yo male with no significant past medical history presenting for a tick bite. His wife noticed a tick bite this morning when he was shaving. His wife was able to remove the tick. He is unsure how long it was attached for and is unsure if it was engorged. He is currently asymptomatic.       History provided by:  Patient   used: No        Prior to Admission Medications   Prescriptions Last Dose Informant Patient Reported? Taking?   cyclobenzaprine (FLEXERIL) 5 mg tablet   Yes No   Sig: Take 10 mg by mouth 3 (three) times a day as needed for muscle spasms   ibuprofen (MOTRIN) 200 mg tablet   Yes No   Sig: Take by mouth every 6 (six) hours as needed for mild pain   naproxen (NAPROSYN) 250 mg tablet   Yes No   Sig: Take by mouth   ondansetron (ZOFRAN-ODT) 4 mg disintegrating tablet   No No   Sig: Take 1 tablet (4 mg total) by mouth every 6 (six) hours as needed for nausea or vomiting      Facility-Administered Medications: None       History reviewed. No pertinent past medical history.    History reviewed. No pertinent surgical history.    History reviewed. No pertinent family history.  I have reviewed and agree with the history as documented.    E-Cigarette/Vaping    E-Cigarette Use Never User      E-Cigarette/Vaping Substances     Social History     Tobacco Use    Smoking status: Former    Smokeless tobacco: Never   Vaping Use    Vaping status: Never Used   Substance Use Topics    Alcohol use: Yes     Comment: social    Drug use: Never       Review of Systems   Constitutional:  Negative for chills and fever.   Eyes:  Negative for discharge and redness.   Musculoskeletal:  Negative for arthralgias and myalgias.   Skin:  Positive for color change and wound.   Psychiatric/Behavioral:  Negative for confusion. The patient is not nervous/anxious.    All other  systems reviewed and are negative.      Physical Exam  Physical Exam  Vitals and nursing note reviewed.   Constitutional:       General: He is not in acute distress.     Appearance: Normal appearance. He is not toxic-appearing.   HENT:      Head: Normocephalic and atraumatic.      Right Ear: External ear normal.      Left Ear: External ear normal.   Eyes:      General: No scleral icterus.        Right eye: No discharge.         Left eye: No discharge.      Conjunctiva/sclera: Conjunctivae normal.   Cardiovascular:      Rate and Rhythm: Normal rate.   Pulmonary:      Effort: Pulmonary effort is normal. No respiratory distress.      Breath sounds: No stridor.   Musculoskeletal:         General: No deformity. Normal range of motion.      Cervical back: Normal range of motion.        Back:    Skin:     General: Skin is warm.   Neurological:      General: No focal deficit present.      Mental Status: He is alert. Mental status is at baseline.   Psychiatric:         Mood and Affect: Mood normal.         Behavior: Behavior normal.         Vital Signs  ED Triage Vitals [03/19/24 1310]   Temperature Pulse Respirations Blood Pressure SpO2   97.9 °F (36.6 °C) 79 18 138/78 97 %      Temp src Heart Rate Source Patient Position - Orthostatic VS BP Location FiO2 (%)   -- -- -- -- --      Pain Score       No Pain           Vitals:    03/19/24 1310   BP: 138/78   Pulse: 79         Visual Acuity      ED Medications  Medications   doxycycline hyclate (VIBRAMYCIN) capsule 200 mg (200 mg Oral Given 3/19/24 1321)       Diagnostic Studies  Results Reviewed       None                   No orders to display              Procedures  Procedures         ED Course                               SBIRT 20yo+      Flowsheet Row Most Recent Value   Initial Alcohol Screen: US AUDIT-C     1. How often do you have a drink containing alcohol? 0 Filed at: 03/19/2024 1310   2. How many drinks containing alcohol do you have on a typical day you are  drinking?  0 Filed at: 03/19/2024 1310   3a. Male UNDER 65: How often do you have five or more drinks on one occasion? 0 Filed at: 03/19/2024 1310   3b. FEMALE Any Age, or MALE 65+: How often do you have 4 or more drinks on one occassion? 0 Filed at: 03/19/2024 1310   Audit-C Score 0 Filed at: 03/19/2024 1310   JONATAN: How many times in the past year have you...    Used an illegal drug or used a prescription medication for non-medical reasons? Never Filed at: 03/19/2024 1310                      Medical Decision Making  38yoM here after a tick bite which was removed at home. No complaints and vitals normal. There is irritation and a scab noted on the back where the tick was removed. Will give doxycycline prophylaxis. Advised f/u with PCP.      Problems Addressed:  Tick bite of back, initial encounter: acute illness or injury    Risk  Prescription drug management.             Disposition  Final diagnoses:   Tick bite of back, initial encounter     Time reflects when diagnosis was documented in both MDM as applicable and the Disposition within this note       Time User Action Codes Description Comment    3/19/2024  1:15 PM Janessa Sanchez Add [S30.860A,  W57.XXXA] Tick bite of back, initial encounter           ED Disposition       ED Disposition   Discharge    Condition   Stable    Date/Time   Tue Mar 19, 2024 1315    Comment   [place] discharge to home/self care.                   Follow-up Information       Follow up With Specialties Details Why Contact Info Additional Information    [facility] Schedule an appointment as soon as possible for a visit   [address] [facility] [address] [phone] [facility], [address], [place], [address], [phone]    [facility] Emergency Department Emergency Medicine  If symptoms worsen [address] [facility] [address] [phone]  [facility], [address], [place], [address]            Discharge Medication List as of 3/19/2024  1:16 PM        CONTINUE these medications which have NOT CHANGED    Details   cyclobenzaprine (FLEXERIL) 5 mg tablet Take 10 mg by mouth 3 (three) times a day as needed for muscle spasms, Historical Med      ibuprofen (MOTRIN) 200 mg tablet Take by mouth every 6 (six) hours as needed for mild pain, Historical Med      naproxen (NAPROSYN) 250 mg tablet Take by mouth, Historical Med      ondansetron (ZOFRAN-ODT) 4 mg disintegrating tablet Take 1 tablet (4 mg total) by mouth every 6 (six) hours as needed for nausea or vomiting, Starting Fri 4/23/2021, Print             No discharge procedures on file.    PDMP Review       None            ED Provider  Electronically Signed by             Janessa Sanchez PA-C  03/19/24 3939